# Patient Record
Sex: MALE | Race: OTHER | Employment: FULL TIME | URBAN - METROPOLITAN AREA
[De-identification: names, ages, dates, MRNs, and addresses within clinical notes are randomized per-mention and may not be internally consistent; named-entity substitution may affect disease eponyms.]

---

## 2017-06-07 ENCOUNTER — LAB ENCOUNTER (OUTPATIENT)
Dept: LAB | Facility: HOSPITAL | Age: 37
End: 2017-06-07
Attending: ORTHOPAEDIC SURGERY
Payer: COMMERCIAL

## 2017-06-07 DIAGNOSIS — M16.11 PRIMARY OSTEOARTHRITIS OF RIGHT HIP: ICD-10-CM

## 2017-06-07 PROCEDURE — 36415 COLL VENOUS BLD VENIPUNCTURE: CPT

## 2017-06-07 PROCEDURE — 86850 RBC ANTIBODY SCREEN: CPT

## 2017-06-07 PROCEDURE — 86900 BLOOD TYPING SEROLOGIC ABO: CPT

## 2017-06-07 PROCEDURE — 81003 URINALYSIS AUTO W/O SCOPE: CPT

## 2017-06-07 PROCEDURE — 86901 BLOOD TYPING SEROLOGIC RH(D): CPT

## 2017-06-15 RX ORDER — DEXTROAMPHETAMINE SACCHARATE, AMPHETAMINE ASPARTATE, DEXTROAMPHETAMINE SULFATE AND AMPHETAMINE SULFATE 2.5; 2.5; 2.5; 2.5 MG/1; MG/1; MG/1; MG/1
10 TABLET ORAL 2 TIMES DAILY
COMMUNITY

## 2017-06-15 RX ORDER — DEXLANSOPRAZOLE 60 MG/1
60 CAPSULE, DELAYED RELEASE ORAL 2 TIMES DAILY
COMMUNITY

## 2017-06-15 RX ORDER — AMLODIPINE BESYLATE AND BENAZEPRIL HYDROCHLORIDE 5; 40 MG/1; MG/1
1 CAPSULE ORAL DAILY
COMMUNITY

## 2017-06-21 ENCOUNTER — SURGERY (OUTPATIENT)
Age: 37
End: 2017-06-21

## 2017-06-21 ENCOUNTER — HOSPITAL ENCOUNTER (INPATIENT)
Facility: HOSPITAL | Age: 37
LOS: 2 days | Discharge: HOME | DRG: 470 | End: 2017-06-23
Attending: ORTHOPAEDIC SURGERY | Admitting: ORTHOPAEDIC SURGERY
Payer: COMMERCIAL

## 2017-06-21 ENCOUNTER — ANESTHESIA EVENT (OUTPATIENT)
Dept: SURGERY | Facility: HOSPITAL | Age: 37
DRG: 470 | End: 2017-06-21
Payer: COMMERCIAL

## 2017-06-21 ENCOUNTER — APPOINTMENT (OUTPATIENT)
Dept: GENERAL RADIOLOGY | Facility: HOSPITAL | Age: 37
DRG: 470 | End: 2017-06-21
Attending: NURSE PRACTITIONER
Payer: COMMERCIAL

## 2017-06-21 ENCOUNTER — ANESTHESIA (OUTPATIENT)
Dept: SURGERY | Facility: HOSPITAL | Age: 37
DRG: 470 | End: 2017-06-21
Payer: COMMERCIAL

## 2017-06-21 DIAGNOSIS — M16.11 PRIMARY OSTEOARTHRITIS OF RIGHT HIP: Primary | ICD-10-CM

## 2017-06-21 DIAGNOSIS — M87.051 AVASCULAR NECROSIS OF BONE OF RIGHT HIP (HCC): ICD-10-CM

## 2017-06-21 PROBLEM — G89.4 CHRONIC PAIN SYNDROME: Chronic | Status: ACTIVE | Noted: 2017-06-21

## 2017-06-21 PROBLEM — I10 HYPERTENSION: Chronic | Status: ACTIVE | Noted: 2017-06-21

## 2017-06-21 PROCEDURE — 72170 X-RAY EXAM OF PELVIS: CPT | Performed by: NURSE PRACTITIONER

## 2017-06-21 PROCEDURE — 99232 SBSQ HOSP IP/OBS MODERATE 35: CPT | Performed by: HOSPITALIST

## 2017-06-21 PROCEDURE — 0SR904A REPLACEMENT OF RIGHT HIP JOINT WITH CERAMIC ON POLYETHYLENE SYNTHETIC SUBSTITUTE, UNCEMENTED, OPEN APPROACH: ICD-10-PCS | Performed by: ORTHOPAEDIC SURGERY

## 2017-06-21 DEVICE — CONT VIVACI-E NEU LN KK 36X56: Type: IMPLANTABLE DEVICE | Site: HIP | Status: FUNCTIONAL

## 2017-06-21 DEVICE — CONT MULTI SHELL 56 KK: Type: IMPLANTABLE DEVICE | Site: HIP | Status: FUNCTIONAL

## 2017-06-21 DEVICE — BIOLOX® DELTA, CERAMIC FEMORAL HEAD, M, Ø 36/0, TAPER 12/14
Type: IMPLANTABLE DEVICE | Site: HIP | Status: FUNCTIONAL
Brand: BIOLOX® DELTA

## 2017-06-21 DEVICE — M/L TAP 10 EXT: Type: IMPLANTABLE DEVICE | Site: HIP | Status: FUNCTIONAL

## 2017-06-21 DEVICE — BONE SCREW 6.5X30 SELF-TAP: Type: IMPLANTABLE DEVICE | Site: HIP | Status: FUNCTIONAL

## 2017-06-21 RX ORDER — DOCUSATE SODIUM 100 MG/1
100 CAPSULE, LIQUID FILLED ORAL 2 TIMES DAILY
Status: DISCONTINUED | OUTPATIENT
Start: 2017-06-21 | End: 2017-06-23

## 2017-06-21 RX ORDER — METOCLOPRAMIDE 10 MG/1
10 TABLET ORAL ONCE
Status: DISCONTINUED | OUTPATIENT
Start: 2017-06-21 | End: 2017-06-21 | Stop reason: HOSPADM

## 2017-06-21 RX ORDER — METOCLOPRAMIDE HYDROCHLORIDE 5 MG/ML
10 INJECTION INTRAMUSCULAR; INTRAVENOUS EVERY 6 HOURS PRN
Status: ACTIVE | OUTPATIENT
Start: 2017-06-21 | End: 2017-06-23

## 2017-06-21 RX ORDER — ONDANSETRON 2 MG/ML
4 INJECTION INTRAMUSCULAR; INTRAVENOUS EVERY 4 HOURS PRN
Status: DISCONTINUED | OUTPATIENT
Start: 2017-06-21 | End: 2017-06-23

## 2017-06-21 RX ORDER — MORPHINE SULFATE 2 MG/ML
2 INJECTION, SOLUTION INTRAMUSCULAR; INTRAVENOUS EVERY 10 MIN PRN
Status: DISCONTINUED | OUTPATIENT
Start: 2017-06-21 | End: 2017-06-21 | Stop reason: HOSPADM

## 2017-06-21 RX ORDER — NALBUPHINE HCL 10 MG/ML
2.5 AMPUL (ML) INJECTION EVERY 4 HOURS PRN
Status: ACTIVE | OUTPATIENT
Start: 2017-06-21 | End: 2017-06-22

## 2017-06-21 RX ORDER — 0.9 % SODIUM CHLORIDE 0.9 %
VIAL (ML) INJECTION
Status: COMPLETED
Start: 2017-06-21 | End: 2017-06-21

## 2017-06-21 RX ORDER — FAMOTIDINE 20 MG/1
20 TABLET ORAL ONCE
Status: DISCONTINUED | OUTPATIENT
Start: 2017-06-21 | End: 2017-06-21 | Stop reason: HOSPADM

## 2017-06-21 RX ORDER — POLYETHYLENE GLYCOL 3350 17 G/17G
17 POWDER, FOR SOLUTION ORAL DAILY PRN
Status: DISCONTINUED | OUTPATIENT
Start: 2017-06-21 | End: 2017-06-23

## 2017-06-21 RX ORDER — ONDANSETRON 2 MG/ML
4 INJECTION INTRAMUSCULAR; INTRAVENOUS ONCE AS NEEDED
Status: DISCONTINUED | OUTPATIENT
Start: 2017-06-21 | End: 2017-06-21 | Stop reason: HOSPADM

## 2017-06-21 RX ORDER — ACETAMINOPHEN 325 MG/1
650 TABLET ORAL EVERY 6 HOURS PRN
Status: DISPENSED | OUTPATIENT
Start: 2017-06-21 | End: 2017-06-22

## 2017-06-21 RX ORDER — OXYCODONE HCL 10 MG/1
10 TABLET, FILM COATED, EXTENDED RELEASE ORAL ONCE
Status: COMPLETED | OUTPATIENT
Start: 2017-06-21 | End: 2017-06-21

## 2017-06-21 RX ORDER — HYDROMORPHONE HYDROCHLORIDE 1 MG/ML
0.2 INJECTION, SOLUTION INTRAMUSCULAR; INTRAVENOUS; SUBCUTANEOUS EVERY 5 MIN PRN
Status: DISCONTINUED | OUTPATIENT
Start: 2017-06-21 | End: 2017-06-21 | Stop reason: HOSPADM

## 2017-06-21 RX ORDER — MORPHINE SULFATE 10 MG/ML
6 INJECTION, SOLUTION INTRAMUSCULAR; INTRAVENOUS EVERY 10 MIN PRN
Status: DISCONTINUED | OUTPATIENT
Start: 2017-06-21 | End: 2017-06-21 | Stop reason: HOSPADM

## 2017-06-21 RX ORDER — FAMOTIDINE 20 MG/1
20 TABLET ORAL 2 TIMES DAILY
Status: DISCONTINUED | OUTPATIENT
Start: 2017-06-21 | End: 2017-06-21 | Stop reason: ALTCHOICE

## 2017-06-21 RX ORDER — DEXAMETHASONE SODIUM PHOSPHATE 4 MG/ML
VIAL (ML) INJECTION AS NEEDED
Status: DISCONTINUED | OUTPATIENT
Start: 2017-06-21 | End: 2017-06-21 | Stop reason: SURG

## 2017-06-21 RX ORDER — BISACODYL 10 MG
10 SUPPOSITORY, RECTAL RECTAL
Status: DISCONTINUED | OUTPATIENT
Start: 2017-06-21 | End: 2017-06-23

## 2017-06-21 RX ORDER — OXYCODONE AND ACETAMINOPHEN 10; 325 MG/1; MG/1
2 TABLET ORAL EVERY 6 HOURS PRN
Status: DISCONTINUED | OUTPATIENT
Start: 2017-06-21 | End: 2017-06-22

## 2017-06-21 RX ORDER — SODIUM CHLORIDE, SODIUM LACTATE, POTASSIUM CHLORIDE, CALCIUM CHLORIDE 600; 310; 30; 20 MG/100ML; MG/100ML; MG/100ML; MG/100ML
INJECTION, SOLUTION INTRAVENOUS CONTINUOUS
Status: DISCONTINUED | OUTPATIENT
Start: 2017-06-21 | End: 2017-06-21 | Stop reason: HOSPADM

## 2017-06-21 RX ORDER — ACETAMINOPHEN 325 MG/1
650 TABLET ORAL ONCE
Status: COMPLETED | OUTPATIENT
Start: 2017-06-21 | End: 2017-06-21

## 2017-06-21 RX ORDER — SODIUM CHLORIDE 0.9 % (FLUSH) 0.9 %
10 SYRINGE (ML) INJECTION AS NEEDED
Status: DISCONTINUED | OUTPATIENT
Start: 2017-06-21 | End: 2017-06-23

## 2017-06-21 RX ORDER — HETASTARCH 6 G/100ML
500 INJECTION, SOLUTION INTRAVENOUS ONCE
Status: COMPLETED | OUTPATIENT
Start: 2017-06-21 | End: 2017-06-21

## 2017-06-21 RX ORDER — HYDROCODONE BITARTRATE AND ACETAMINOPHEN 7.5; 325 MG/1; MG/1
2 TABLET ORAL EVERY 6 HOURS PRN
Status: DISCONTINUED | OUTPATIENT
Start: 2017-06-21 | End: 2017-06-22

## 2017-06-21 RX ORDER — HALOPERIDOL 5 MG/ML
0.5 INJECTION INTRAMUSCULAR ONCE AS NEEDED
Status: ACTIVE | OUTPATIENT
Start: 2017-06-21 | End: 2017-06-21

## 2017-06-21 RX ORDER — DIPHENHYDRAMINE HCL 25 MG
25 CAPSULE ORAL EVERY 4 HOURS PRN
Status: ACTIVE | OUTPATIENT
Start: 2017-06-21 | End: 2017-06-22

## 2017-06-21 RX ORDER — CELECOXIB 200 MG/1
200 CAPSULE ORAL ONCE
Status: COMPLETED | OUTPATIENT
Start: 2017-06-21 | End: 2017-06-21

## 2017-06-21 RX ORDER — HYDROMORPHONE HYDROCHLORIDE 1 MG/ML
0.6 INJECTION, SOLUTION INTRAMUSCULAR; INTRAVENOUS; SUBCUTANEOUS
Status: DISCONTINUED | OUTPATIENT
Start: 2017-06-21 | End: 2017-06-22

## 2017-06-21 RX ORDER — DIPHENHYDRAMINE HYDROCHLORIDE 50 MG/ML
12.5 INJECTION INTRAMUSCULAR; INTRAVENOUS EVERY 4 HOURS PRN
Status: ACTIVE | OUTPATIENT
Start: 2017-06-21 | End: 2017-06-22

## 2017-06-21 RX ORDER — SCOLOPAMINE TRANSDERMAL SYSTEM 1 MG/1
1 PATCH, EXTENDED RELEASE TRANSDERMAL ONCE
Status: DISCONTINUED | OUTPATIENT
Start: 2017-06-21 | End: 2017-06-23

## 2017-06-21 RX ORDER — HYDROCODONE BITARTRATE AND ACETAMINOPHEN 5; 325 MG/1; MG/1
2 TABLET ORAL AS NEEDED
Status: DISCONTINUED | OUTPATIENT
Start: 2017-06-21 | End: 2017-06-21 | Stop reason: HOSPADM

## 2017-06-21 RX ORDER — SODIUM CHLORIDE, SODIUM LACTATE, POTASSIUM CHLORIDE, CALCIUM CHLORIDE 600; 310; 30; 20 MG/100ML; MG/100ML; MG/100ML; MG/100ML
INJECTION, SOLUTION INTRAVENOUS CONTINUOUS PRN
Status: DISCONTINUED | OUTPATIENT
Start: 2017-06-21 | End: 2017-06-21 | Stop reason: SURG

## 2017-06-21 RX ORDER — BUPIVACAINE HYDROCHLORIDE 7.5 MG/ML
INJECTION, SOLUTION INTRASPINAL AS NEEDED
Status: DISCONTINUED | OUTPATIENT
Start: 2017-06-21 | End: 2017-06-21 | Stop reason: SURG

## 2017-06-21 RX ORDER — OXYCODONE HCL 20 MG/1
20 TABLET, FILM COATED, EXTENDED RELEASE ORAL EVERY 8 HOURS
Status: DISCONTINUED | OUTPATIENT
Start: 2017-06-21 | End: 2017-06-22

## 2017-06-21 RX ORDER — ONDANSETRON 2 MG/ML
4 INJECTION INTRAMUSCULAR; INTRAVENOUS ONCE AS NEEDED
Status: ACTIVE | OUTPATIENT
Start: 2017-06-21 | End: 2017-06-21

## 2017-06-21 RX ORDER — HYDROCODONE BITARTRATE AND ACETAMINOPHEN 7.5; 325 MG/1; MG/1
1 TABLET ORAL EVERY 6 HOURS PRN
Status: DISCONTINUED | OUTPATIENT
Start: 2017-06-21 | End: 2017-06-22

## 2017-06-21 RX ORDER — MORPHINE SULFATE 4 MG/ML
4 INJECTION, SOLUTION INTRAMUSCULAR; INTRAVENOUS EVERY 10 MIN PRN
Status: DISCONTINUED | OUTPATIENT
Start: 2017-06-21 | End: 2017-06-21 | Stop reason: HOSPADM

## 2017-06-21 RX ORDER — MORPHINE SULFATE 1 MG/ML
INJECTION, SOLUTION EPIDURAL; INTRATHECAL; INTRAVENOUS AS NEEDED
Status: DISCONTINUED | OUTPATIENT
Start: 2017-06-21 | End: 2017-06-21 | Stop reason: SURG

## 2017-06-21 RX ORDER — SODIUM PHOSPHATE, DIBASIC AND SODIUM PHOSPHATE, MONOBASIC 7; 19 G/133ML; G/133ML
1 ENEMA RECTAL ONCE AS NEEDED
Status: DISCONTINUED | OUTPATIENT
Start: 2017-06-21 | End: 2017-06-23

## 2017-06-21 RX ORDER — FAMOTIDINE 10 MG/ML
20 INJECTION, SOLUTION INTRAVENOUS 2 TIMES DAILY
Status: DISCONTINUED | OUTPATIENT
Start: 2017-06-21 | End: 2017-06-21 | Stop reason: ALTCHOICE

## 2017-06-21 RX ORDER — HYDROMORPHONE HYDROCHLORIDE 4 MG/1
4 TABLET ORAL
Status: DISCONTINUED | OUTPATIENT
Start: 2017-06-21 | End: 2017-06-22

## 2017-06-21 RX ORDER — AMLODIPINE BESYLATE AND BENAZEPRIL HYDROCHLORIDE 5; 40 MG/1; MG/1
1 CAPSULE ORAL DAILY
Status: DISCONTINUED | OUTPATIENT
Start: 2017-06-22 | End: 2017-06-21 | Stop reason: RX

## 2017-06-21 RX ORDER — SODIUM CHLORIDE 9 MG/ML
INJECTION, SOLUTION INTRAVENOUS CONTINUOUS
Status: DISCONTINUED | OUTPATIENT
Start: 2017-06-21 | End: 2017-06-23

## 2017-06-21 RX ORDER — MIDAZOLAM HYDROCHLORIDE 1 MG/ML
INJECTION INTRAMUSCULAR; INTRAVENOUS AS NEEDED
Status: DISCONTINUED | OUTPATIENT
Start: 2017-06-21 | End: 2017-06-21 | Stop reason: SURG

## 2017-06-21 RX ORDER — HALOPERIDOL 5 MG/ML
0.25 INJECTION INTRAMUSCULAR ONCE AS NEEDED
Status: DISCONTINUED | OUTPATIENT
Start: 2017-06-21 | End: 2017-06-21 | Stop reason: HOSPADM

## 2017-06-21 RX ORDER — MAGNESIUM HYDROXIDE 1200 MG/15ML
LIQUID ORAL CONTINUOUS PRN
Status: DISCONTINUED | OUTPATIENT
Start: 2017-06-21 | End: 2017-06-21

## 2017-06-21 RX ORDER — ONDANSETRON 2 MG/ML
INJECTION INTRAMUSCULAR; INTRAVENOUS AS NEEDED
Status: DISCONTINUED | OUTPATIENT
Start: 2017-06-21 | End: 2017-06-21 | Stop reason: SURG

## 2017-06-21 RX ORDER — NALOXONE HYDROCHLORIDE 0.4 MG/ML
0.08 INJECTION, SOLUTION INTRAMUSCULAR; INTRAVENOUS; SUBCUTANEOUS
Status: ACTIVE | OUTPATIENT
Start: 2017-06-21 | End: 2017-06-22

## 2017-06-21 RX ORDER — DEXTROAMPHETAMINE SACCHARATE, AMPHETAMINE ASPARTATE, DEXTROAMPHETAMINE SULFATE AND AMPHETAMINE SULFATE 2.5; 2.5; 2.5; 2.5 MG/1; MG/1; MG/1; MG/1
10 TABLET ORAL 2 TIMES DAILY
Status: DISCONTINUED | OUTPATIENT
Start: 2017-06-21 | End: 2017-06-23

## 2017-06-21 RX ORDER — BUPIVACAINE HYDROCHLORIDE 2.5 MG/ML
INJECTION, SOLUTION EPIDURAL; INFILTRATION; INTRACAUDAL AS NEEDED
Status: DISCONTINUED | OUTPATIENT
Start: 2017-06-21 | End: 2017-06-21 | Stop reason: HOSPADM

## 2017-06-21 RX ORDER — KETOROLAC TROMETHAMINE 30 MG/ML
30 INJECTION, SOLUTION INTRAMUSCULAR; INTRAVENOUS ONCE
Status: COMPLETED | OUTPATIENT
Start: 2017-06-21 | End: 2017-06-21

## 2017-06-21 RX ORDER — LIDOCAINE HYDROCHLORIDE 10 MG/ML
INJECTION, SOLUTION EPIDURAL; INFILTRATION; INTRACAUDAL; PERINEURAL AS NEEDED
Status: DISCONTINUED | OUTPATIENT
Start: 2017-06-21 | End: 2017-06-21 | Stop reason: SURG

## 2017-06-21 RX ORDER — PANTOPRAZOLE SODIUM 40 MG/1
40 TABLET, DELAYED RELEASE ORAL
Status: DISCONTINUED | OUTPATIENT
Start: 2017-06-22 | End: 2017-06-23

## 2017-06-21 RX ORDER — HYDROMORPHONE HYDROCHLORIDE 1 MG/ML
0.4 INJECTION, SOLUTION INTRAMUSCULAR; INTRAVENOUS; SUBCUTANEOUS EVERY 5 MIN PRN
Status: DISCONTINUED | OUTPATIENT
Start: 2017-06-21 | End: 2017-06-21 | Stop reason: HOSPADM

## 2017-06-21 RX ORDER — HYDROMORPHONE HYDROCHLORIDE 1 MG/ML
0.4 INJECTION, SOLUTION INTRAMUSCULAR; INTRAVENOUS; SUBCUTANEOUS
Status: DISCONTINUED | OUTPATIENT
Start: 2017-06-21 | End: 2017-06-22

## 2017-06-21 RX ORDER — DIPHENHYDRAMINE HYDROCHLORIDE 50 MG/ML
INJECTION INTRAMUSCULAR; INTRAVENOUS AS NEEDED
Status: DISCONTINUED | OUTPATIENT
Start: 2017-06-21 | End: 2017-06-21 | Stop reason: SURG

## 2017-06-21 RX ORDER — HYDROMORPHONE HYDROCHLORIDE 1 MG/ML
0.6 INJECTION, SOLUTION INTRAMUSCULAR; INTRAVENOUS; SUBCUTANEOUS EVERY 5 MIN PRN
Status: DISCONTINUED | OUTPATIENT
Start: 2017-06-21 | End: 2017-06-21 | Stop reason: HOSPADM

## 2017-06-21 RX ORDER — NALOXONE HYDROCHLORIDE 0.4 MG/ML
80 INJECTION, SOLUTION INTRAMUSCULAR; INTRAVENOUS; SUBCUTANEOUS AS NEEDED
Status: DISCONTINUED | OUTPATIENT
Start: 2017-06-21 | End: 2017-06-21 | Stop reason: HOSPADM

## 2017-06-21 RX ORDER — HYDROCODONE BITARTRATE AND ACETAMINOPHEN 5; 325 MG/1; MG/1
1 TABLET ORAL AS NEEDED
Status: DISCONTINUED | OUTPATIENT
Start: 2017-06-21 | End: 2017-06-21 | Stop reason: HOSPADM

## 2017-06-21 RX ADMIN — MIDAZOLAM HYDROCHLORIDE 4 MG: 1 INJECTION INTRAMUSCULAR; INTRAVENOUS at 09:10:00

## 2017-06-21 RX ADMIN — MORPHINE SULFATE 0.3 MG: 1 INJECTION, SOLUTION EPIDURAL; INTRATHECAL; INTRAVENOUS at 09:16:00

## 2017-06-21 RX ADMIN — SODIUM CHLORIDE, SODIUM LACTATE, POTASSIUM CHLORIDE, CALCIUM CHLORIDE: 600; 310; 30; 20 INJECTION, SOLUTION INTRAVENOUS at 10:10:00

## 2017-06-21 RX ADMIN — DIPHENHYDRAMINE HYDROCHLORIDE 25 MG: 50 INJECTION INTRAMUSCULAR; INTRAVENOUS at 09:25:00

## 2017-06-21 RX ADMIN — DEXAMETHASONE SODIUM PHOSPHATE 4 MG: 4 MG/ML VIAL (ML) INJECTION at 09:25:00

## 2017-06-21 RX ADMIN — SODIUM CHLORIDE, SODIUM LACTATE, POTASSIUM CHLORIDE, CALCIUM CHLORIDE: 600; 310; 30; 20 INJECTION, SOLUTION INTRAVENOUS at 09:10:00

## 2017-06-21 RX ADMIN — BUPIVACAINE HYDROCHLORIDE 2 ML: 7.5 INJECTION, SOLUTION INTRASPINAL at 09:16:00

## 2017-06-21 RX ADMIN — ONDANSETRON 4 MG: 2 INJECTION INTRAMUSCULAR; INTRAVENOUS at 09:25:00

## 2017-06-21 RX ADMIN — LIDOCAINE HYDROCHLORIDE 10 MG: 10 INJECTION, SOLUTION EPIDURAL; INFILTRATION; INTRACAUDAL; PERINEURAL at 09:16:00

## 2017-06-21 NOTE — PROGRESS NOTES
Lotrel (amlodipine/benazepril) has been substituted with amlodipine/lisinopril per P & T Committee Protocol.     Smith LuiD

## 2017-06-21 NOTE — ANESTHESIA PROCEDURE NOTES
Spinal Block  Performed by: Malcolm Verdugo  Authorized by: Malcolm Verdugo    Patient Location:  OR  Start Time:  6/21/2017 9:15 AM  End Time:  6/21/2017 9:20 AM  Site identification: surface landmarks    Anesthesiologist:  Malcolm Verdugo  Performed by:   Ronda

## 2017-06-21 NOTE — DISCHARGE PLANNING
SW received order for s/p total joint. SW met w/ pt to discuss discharge planning. Pt lives w/ his wife and 3 children, in a 2 level house w/ 14 stairs. Pt reports to be independent w/ all ADL's and does not own any DME.  Pt's wife will be his caregiver at

## 2017-06-21 NOTE — OPERATIVE REPORT
[facility]    TATI Brief Operative Note    Ovi Yousif. Patient Status:  Surgery Admit    1980 MRN [id number]   [place] [address] Attending Fior Crandall MD     PCP No primary care provider on file.

## 2017-06-21 NOTE — ANESTHESIA PREPROCEDURE EVALUATION
Anesthesia PreOp Note    HPI:     Opal Mckenna. is a 29year old male who presents for preoperative consultation requested by: Sushma Pacheco MD    Date of Surgery: 6/21/2017    Procedure(s):  HIP TOTAL REPLACEMENT  Indication: Degenerative joint d N, NP 1 patch at 06/21/17 0817   CeFAZolin Sodium (ANCEF/KEFZOL) IVPB premix 2 g 2 g Intravenous Once Maria Ines Day NP    bupivacaine liposome (EXPAREL) 1.3 % injection SUSP 20 mL 20 mL Infiltration On Call to OR Grazyna Knight MD    Tranexamic Acid for pain control at [facility] that did not work. Accepts spinal/duramorph. GA back up. Consent checked. Taking a lot of pain meds at home. Post op pain control may be an issue.   Informed Consent Plan and Risks Discussed With:  Patient      I have informed

## 2017-06-21 NOTE — H&P
History & Physical Examination    Patient Name: Juan Galarza.   MRN: [id number]  [facility]: [id number]  YOB: 1980    Diagnosis: right hip avascular necrosis    Present Illness: 28 y/o male with R hip AVN, has exhausted non surgical treatment mg 0.2 mg Intravenous Q5 Min PRN   HYDROmorphone HCl PF (DILAUDID) 1 MG/ML injection 0.4 mg 0.4 mg Intravenous Q5 Min PRN   HYDROmorphone HCl PF (DILAUDID) 1 MG/ML injection 0.6 mg 0.6 mg Intravenous Q5 Min PRN   morphINE sulfate (PF) 2 MG/ML injection 2 m On Call to OR       Allergies: Review of patient's allergies indicates no known allergies.     Past Medical History   Diagnosis Date   • High blood pressure    • Back problem    • Anxiety state    • Attention deficit hyperactivity disorder (ADHD)    • TIA (

## 2017-06-21 NOTE — PHYSICAL THERAPY NOTE
PHYSICAL THERAPY HIP EVALUATION - INPATIENT     Room Number: 436/436-A  Evaluation Date: 6/21/2017  Type of Evaluation: Initial  Physician Order: PT Eval and Treat    Presenting Problem: AVN s/p right total hip arthroplasty  Reason for Therapy: Mobility Dy Avascular necrosis of bone of right hip (HCC)  Active Problems:    Hypertension    Chronic pain syndrome    Aseptic necrosis of bone of right hip [facility])      Past Medical History  Past Medical History   Diagnosis Date   • High blood pressure    • Back probl MOBILITY  How much difficulty does the patient currently have. ..  -   Turning over in bed (including adjusting bedclothes, sheets and blankets)?: A Little   -   Sitting down on and standing up from a chair with arms (e.g., wheelchair, bedside commode, etc. supervision   Goal #4   Current Status    Goal #5 Patient verbalizes and/or demonstrates all precautions and safety concerns independently   Goal #5   Current Status    Goal #6 Patient independently performs home exercise program for ROM/strengthening per

## 2017-06-21 NOTE — ANESTHESIA POSTPROCEDURE EVALUATION
Patient: Jez Cabrera.     Procedure Summary     Date Anesthesia Start Anesthesia Stop Room / Location    06/21/17 0910 1057 [facility] MAIN OR 10 / [facility] MAIN OR       Procedure Diagnosis Surgeon Responsible Provider    HIP TOTAL REPLACEMENT (Right Hip) (Jena

## 2017-06-21 NOTE — PROGRESS NOTES
[facility] - [facility]    Progress Note    Cynthia Banda.  Patient Status:  Surgery Admit    1980 MRN [id number]   Location [address] [facility] Attending Dez Reyes MD   Hosp Day # 0 PCP No primary care prov Chronic pain syndrome  TAKES TAPENDATOL AT HOME, RESUME HOME DOSE, CONSULT PAIN SERVICE.          Results:   No results found for: WBC, HGB, HCT, PLT, CREATSERUM, BUN, NA, K, CL, CO2, GLU, CA, ALB, ALKPHO, BILT, TP, AST, ALT, PTT, INR, PT, T4F, TSH, TSHREFL

## 2017-06-22 PROCEDURE — 99232 SBSQ HOSP IP/OBS MODERATE 35: CPT | Performed by: HOSPITALIST

## 2017-06-22 RX ORDER — ACETAMINOPHEN 325 MG/1
650 TABLET ORAL EVERY 6 HOURS PRN
Status: DISCONTINUED | OUTPATIENT
Start: 2017-06-22 | End: 2017-06-23

## 2017-06-22 RX ORDER — HYDROMORPHONE HYDROCHLORIDE 4 MG/1
8 TABLET ORAL
Status: DISCONTINUED | OUTPATIENT
Start: 2017-06-22 | End: 2017-06-22

## 2017-06-22 RX ORDER — HYDROMORPHONE HYDROCHLORIDE 4 MG/1
12 TABLET ORAL
Status: DISCONTINUED | OUTPATIENT
Start: 2017-06-22 | End: 2017-06-23

## 2017-06-22 RX ORDER — HYDROMORPHONE HYDROCHLORIDE 4 MG/1
8 TABLET ORAL
Status: DISCONTINUED | OUTPATIENT
Start: 2017-06-22 | End: 2017-06-23

## 2017-06-22 RX ORDER — HYDROMORPHONE HYDROCHLORIDE 4 MG/1
4 TABLET ORAL ONCE
Status: COMPLETED | OUTPATIENT
Start: 2017-06-22 | End: 2017-06-22

## 2017-06-22 NOTE — PHYSICAL THERAPY NOTE
Pt is being seen today BID for therapy. Will attempt stairs in the therapy gym in the pm session. Pt is on track to dc to home with assist and HHC ,but pt stated he might want to start out patient therapy right away.

## 2017-06-22 NOTE — OPERATIVE REPORT
[address] [address]?[phone]\H OPERATIVE REPORT\[id number] NAME: Venkatesh Nino   MR#: [id number]   PMD:     DATE OF PROCEDURE: 21-Jun-2017   PREOPERATIVE DIAGNOSIS: Osteonecrosis of the right hip procedure and informed consent was obtained.  The risks discussed included, but were not limited, to infection, nerve injury (sciatic and femoral nerves), arterial injury (femoral and superior gluteal arteries), deep venous thrombosis, pulmonary embolus, we This gave us excellent visualization of the hip joint and protection of the sciatic nerve.  We then dislocated the hip with flexion, adduction and internal rotation and measured approximately a 10mm neck osteotomy proximal to the lesser trochanter as we tem degrees of anteversion. Once the stem was fully seated, we then cleaned and dried the trunion of the stem and placed our 36mm, +0mm offset trial femoral head in place and impacted it into position.  Once it was fully seated, we then reduced the hip and took

## 2017-06-22 NOTE — PROGRESS NOTES
[facility]    Progress Note    Rachid Aguirre. Patient Status:  Inpatient    1980 MRN [id number]   Location [facility] 4W/SW/SE Attending Red Leslie MD   Hosp Day # 1 PCP No primary care provider on file.        Sub CONCLUSION: Right hip arthroplasty.                Leigh Ann Jain MD  [facility]  [phone] (c)  [phone] (o)  6/22/2017

## 2017-06-22 NOTE — DISCHARGE SUMMARY
[facility]    Discharge Summary    Otf Garrett. Patient Status:  Inpatient    1980 MRN [id number]   Location [facility] 4W/SW/SE Attending Zoe Roblero MD   Hosp Day # 1 PCP No primary care provider on file.      Date of calm and appropriate  Skin:  No rash, no lesion       Discharge Medications      START taking these medications       Instructions Prescription details    HYDROmorphone HCl 8 MG Tabs   Last time this was given:  12 mg on 6/23/2017  1:50 PM   Commonly known Where to Get Your Medications      Please  your prescriptions at the location directed by your doctor or nurse     Bring a paper prescription for each of these medications    - HYDROmorphone HCl 8 MG Tabs            Follow up Visits:

## 2017-06-22 NOTE — PLAN OF CARE
Patient continued to rate his pain as a 9/10 after first dose of Dilaudid 4 mg. Called MD.  Ordered to give a one dose of 8 mg of Dilaudid. Given by this RN.   Continued to have pain after PT and rest.  Called MD.  Ordered to give a 1 X dose of 4 mg wait

## 2017-06-22 NOTE — PROGRESS NOTES
[facility]    Progress Note    Juan Galarza. Patient Status:  Inpatient    1980 MRN [id number]   Location [facility] 4W/SW/SE Attending Crys Vazquez MD   Hosp Day # 1 PCP No primary care provider on file.      Subjective: Metoclopramide HCl, Prochlorperazine Edisylate, Tapentadol HCl ER, oxyCODONE-acetaminophen, HYDROmorphone HCl     Assessment and Plan:     Avascular necrosis of bone of right hip  - s/p hip arthroplasty  - pain control per anesthesia - on nucynta and dilau

## 2017-06-22 NOTE — OCCUPATIONAL THERAPY NOTE
OCCUPATIONAL THERAPY EVALUATION - INPATIENT      Room Number: 436/436-A  Evaluation Date: 6/22/2017  Type of Evaluation: Initial       Physician Order: IP Consult to Occupational Therapy  Reason for Therapy: ADL/IADL Dysfunction and Discharge Planning    O Surgical History    APPENDECTOMY      OTHER Left     Comment hip replacement    TOTAL HIP REPLACEMENT         HOME SITUATION  Type of Home: House  Home Layout: Two level  Lives With: Spouse       Shower/Tub and Equipment: Tub-shower combo  Other Equipment: (AM-PAC Scale): 57.54  CMS Modifier (G-Code): CH    FUNCTIONAL TRANSFER ASSESSMENT  Supine to Sit : Not tested  Sit to Stand: Supervision    Toilet Transfer: SBA  Shower Transfer: NT  Chair Transfer: SBA  Car Transfer: NT    Bedroom Mobility: SBA with use

## 2017-06-22 NOTE — PHYSICAL THERAPY NOTE
PHYSICAL THERAPY HIP TREATMENT NOTE - INPATIENT    Room Number: 436/436-A            Presenting Problem: AVN s/p right total hip arthroplasty    Problem List  Principal Problem:    Avascular necrosis of bone of right hip (HCC)  Active Problems:    Hyperten blankets)?: A Little   -   Sitting down on and standing up from a chair with arms (e.g., wheelchair, bedside commode, etc.): A Little   -   Moving from lying on back to sitting on the side of the bed?: A Little   How much help from another person does the Status Pt 'am/250'x2 pm sessions with the RW SBA. Goal #4 Patient will negotiate 14 stairs/one curb w/ assistive device and supervision   Goal #4   Current Status Pt performed 4 stairs x2 with 1 HR CGA for safety.     Goal #5 Patient verbalizes an

## 2017-06-22 NOTE — PLAN OF CARE
DISCHARGE PLANNING    • Discharge to home or other facility with appropriate resources Progressing    DAYANNA PLANS TO D/C HOME WITH OUTPATIENT PT WHEN CLEARED      HEMATOLOGIC - ADULT    • Maintains hematologic stability Progressing    • Free from bleedin

## 2017-06-22 NOTE — PAYOR COMM NOTE
Frieda Henry #[id number]    Admission Info: Inpatient (Adm: 06/21/17)   Hospital Account: [de-identified]    Description: 29year old M   Primary Service: Surgical   Unit Info: [address] [place] [address]         History and Physical      H&P by Kerrie Severino NP a patch  Transdermal  Once    CeFAZolin Sodium (ANCEF/KEFZOL) IVPB premix 2 g  2 g  Intravenous  Once    lactated ringers infusion    Intravenous  Continuous    HYDROcodone-acetaminophen (NORCO) 5-325 MG per tab 1 tablet  1 tablet  Oral  PRN    Or          H hydrocodone-acetaminophen (NORCO) 7.5-325 MG per tab 2 tablet  2 tablet  Oral  Q6H PRN    HYDROmorphone HCl PF (DILAUDID) 1 MG/ML injection 0.4 mg  0.4 mg  Intravenous  Q1H PRN    HYDROmorphone HCl PF (DILAUDID) 1 MG/ML injection 0.6 mg  0.6 mg  Intraven care.  [ x ] I have reviewed the History and Physical done within the last 30 days.  Any changes noted above.     SANDI URRUTIA  6/21/2017  8:50 AM  [phone]              Operative Report by Dima Clark MD at 6/21/2017  7:33 PM      Author: Lisandro Vogt corticosteroid injection. The patient`s pain progressed to the point that his activities of daily living are limited and he has a very limited range of motion and limited ability to walk.  This has negatively affected his ability to perform the basic [place] subcutaneous tissue. Bovie cautery was used to maintain hemostasis as we dissected sharply down to the level of the deep fascia. The deep fascia was identified and incised in line with our incision and our Charnley retractor was placed deep to this layer. t the medullary canal and a lateralizing reamer proximally. We then successfully broached up to a size 10 and noted that we had excellent fit and fill proximally within the femur and good rotational and axial stability.  We then trialed our 36mm, +0mm offset 24 hours. Pierre Rubinstein was present for all critical portions of the surgery and a backup surgeon was available at all times in case of emergency. \DICTATED BY: Chelsea Ko MD  D: 6/21/2017 8:04:27 PM            Digitally Signed by: Chelsea Ko MD. Reason: I have r     Recent Labs    Lab  06/22/17   0446    BUN   14    CREATSERUM   1.17    GFRAA   >60    GFRNAA   >60    CA   7.5*    NA   134*    K   4.1    CL   104    CO2   27    GLU   118*        Xr Pelvis (1 View) (cpt=72170)    6/21/2017  CONCLUSION:        Thais chronic pain     History of Present Illness:  Lakesha Mancilla. is a a(n) 29year old male who is s/p right total hip arthroplasty. Patient reports right hip pain. He rates his current pain as a 7/10.  Patient reports a long history of chronic hip pain da MG per tab 1 tablet, 1 tablet, Oral, Q6H PRN  •  hydrocodone-acetaminophen (NORCO) 7.5-325 MG per tab 2 tablet, 2 tablet, Oral, Q6H PRN  •  HYDROmorphone HCl PF (DILAUDID) 1 MG/ML injection 0.4 mg, 0.4 mg, Intravenous, Q1H PRN  •  HYDROmorphone HCl PF (DIL PRN    Review of Systems:  Pertinent items are noted in HPI. Physical Exam:  Blood pressure 101/51, pulse 93, temperature 98.8 °F (37.1 °C), temperature source Oral, resp. rate 20, height 6' 2\" (1.88 m), weight 226 lb (102.513 kg), SpO2 100 %.   Alert

## 2017-06-23 VITALS
TEMPERATURE: 100 F | SYSTOLIC BLOOD PRESSURE: 121 MMHG | HEIGHT: 74 IN | RESPIRATION RATE: 18 BRPM | WEIGHT: 226 LBS | BODY MASS INDEX: 29 KG/M2 | DIASTOLIC BLOOD PRESSURE: 66 MMHG | HEART RATE: 105 BPM | OXYGEN SATURATION: 98 %

## 2017-06-23 PROCEDURE — 99239 HOSP IP/OBS DSCHRG MGMT >30: CPT | Performed by: HOSPITALIST

## 2017-06-23 RX ORDER — HYDROMORPHONE HYDROCHLORIDE 8 MG/1
TABLET ORAL
Qty: 90 TABLET | Refills: 0 | Status: SHIPPED | OUTPATIENT
Start: 2017-06-23 | End: 2017-07-08

## 2017-06-23 NOTE — PROGRESS NOTES
[facility]  Inpatient Pain Management Progress Note      Patient name: [place]. 29year old male  : 1980  MRN: [id number]    Diagnosis: s/p right TATI    Reason for Consult: postoperative [place]     Current hospital day: [facility]

## 2017-06-23 NOTE — PHYSICAL THERAPY NOTE
Pt is being seen today BID for therapy. Pt is on track to dc to home with assist and to out patient therapy once medically cleared.

## 2017-06-23 NOTE — PROGRESS NOTES
[facility] - [facility]    Progress Note    Ovi Yousif. Patient Status:  Inpatient    1980 MRN [id number]   Location [facility] 4W/SW/SE Attending Henry Mejias MD   Hosp Day # 2 PCP No primary care provider on file.        Subjectiv home with home health when clears PT  6. Numbness to LLE: will monitor at this point given no motor deficits.     Results:     Recent Labs   Lab  06/22/17   0446  06/23/17   0455   RBC  3.51*  4.11*   HGB  10.6*  12.3*   HCT  31.0*  36.6*   MCV  88.4  89.0

## 2017-09-20 NOTE — PROGRESS NOTES
[facility] HOSP - [facility]  Report of Consultation    Juan aGlarza. Patient Status:  Inpatient    1980 MRN [id number]   Location [facility] 4W/SW/SE Attending Aleksandr Doyle MD   Hosp Day # 1 PCP No primary care provider on file. facility-administered medications:   •  scopolamine (TRANSDERM-SCOP) 1.5 mg patch, 1 patch, Transdermal, Once  •  Naloxone HCl (NARCAN) 0.4 MG/ML injection 0.08 mg, 0.08 mg, Intravenous, Q5 Min PRN  •  acetaminophen (TYLENOL) tab 650 mg, 650 mg, Oral, Q6H (EE[facility] only), , Oral, Daily  •  Nucynta ER (Tapentadol) 50mg PT OWN MED, 50 mg, Oral, BID PRN  •  OxyCODONE HCl ER (OXYCONTIN) 12 hr tab 20 mg, 20 mg, Oral, Q8H  •  oxyCODONE-acetaminophen (PERCOCET)  MG per tab 2 tablet, 2 tablet, Oral, Q6H PRN  •  HY 97.9

## 2019-03-11 PROBLEM — F11.20 OPIOID DEPENDENCE (HCC): Status: ACTIVE | Noted: 2019-03-11

## 2019-03-11 PROBLEM — M25.559 HIP PAIN: Status: ACTIVE | Noted: 2019-03-11

## 2019-03-11 PROBLEM — M79.18 MYOFASCIAL PAIN: Status: ACTIVE | Noted: 2019-03-11

## 2019-03-11 PROBLEM — M46.1 INFLAMMATION OF SACROILIAC JOINT (HCC): Status: ACTIVE | Noted: 2019-03-11

## 2019-03-11 PROBLEM — G89.29 CHRONIC PAIN: Status: ACTIVE | Noted: 2019-03-11

## 2019-04-08 PROBLEM — Z90.49 S/P LAPAROSCOPIC CHOLECYSTECTOMY: Status: ACTIVE | Noted: 2019-04-08

## 2023-10-26 ENCOUNTER — HOSPITAL ENCOUNTER (OUTPATIENT)
Dept: GENERAL RADIOLOGY | Age: 43
Discharge: HOME | End: 2023-10-26
Attending: FAMILY MEDICINE

## 2023-10-26 DIAGNOSIS — M25.551 BILATERAL HIP PAIN: ICD-10-CM

## 2023-10-26 DIAGNOSIS — M25.552 BILATERAL HIP PAIN: ICD-10-CM

## 2023-10-26 DIAGNOSIS — M54.2 CERVICALGIA: ICD-10-CM

## 2023-10-26 DIAGNOSIS — M54.50 LUMBAR PAIN: ICD-10-CM

## 2023-10-26 DIAGNOSIS — M54.17 LUMBOSACRAL RADICULITIS: ICD-10-CM

## 2023-10-26 DIAGNOSIS — M46.1 SACROILIITIS, NOT ELSEWHERE CLASSIFIED (CMD): ICD-10-CM

## 2023-10-26 DIAGNOSIS — M54.12 CERVICAL RADICULITIS: ICD-10-CM

## 2023-10-26 PROCEDURE — 73523 X-RAY EXAM HIPS BI 5/> VIEWS: CPT

## 2023-10-26 PROCEDURE — 72052 X-RAY EXAM NECK SPINE 6/>VWS: CPT

## 2023-10-26 PROCEDURE — 72110 X-RAY EXAM L-2 SPINE 4/>VWS: CPT

## 2023-11-02 ENCOUNTER — HOSPITAL ENCOUNTER (OUTPATIENT)
Dept: GENERAL RADIOLOGY | Age: 43
Discharge: HOME | End: 2023-11-02
Attending: ANESTHESIOLOGY

## 2023-11-02 DIAGNOSIS — M25.562 KNEE PAIN, LEFT: ICD-10-CM

## 2023-11-02 DIAGNOSIS — M25.561 KNEE PAIN, RIGHT: ICD-10-CM

## 2023-11-02 PROCEDURE — 73564 X-RAY EXAM KNEE 4 OR MORE: CPT

## 2023-12-07 ENCOUNTER — APPOINTMENT (OUTPATIENT)
Dept: URGENT CARE | Age: 43
End: 2023-12-07
Attending: FAMILY MEDICINE

## (undated) DEVICE — 2T8 #2 PDO 24 X 24: Brand: 2T8 #2 PDO 24 X 24

## (undated) DEVICE — PEN: MARKING STD PT 100/CS: Brand: MEDICAL ACTION INDUSTRIES

## (undated) DEVICE — T5 HOOD WITH PEEL AWAY FACE SHIELD

## (undated) DEVICE — 20 ML SYRINGE LUER-LOCK TIP: Brand: MONOJECT

## (undated) DEVICE — SOL  .9 3000ML

## (undated) DEVICE — POUCH: SSEAL TYVEK 2000/CS: Brand: MEDICAL ACTION INDUSTRIES

## (undated) DEVICE — PILLOW ABDUCTION HIP MED

## (undated) DEVICE — CAUTERY TIP BLADE EXTENSION

## (undated) DEVICE — STERILE LATEX POWDER-FREE SURGICAL GLOVES WITH HYDROGEL COATING, SMOOTH FINISH, STRAIGHT FINGER: Brand: PROTEXIS

## (undated) DEVICE — SOLUTION SURG DURA PREP HAZMAT

## (undated) DEVICE — SUTURE ETHIBOND 2 V-37

## (undated) DEVICE — PACK CDS TOTAL HIP

## (undated) DEVICE — DUAL CUT SAGITTAL BLADE

## (undated) DEVICE — BATTERY

## (undated) DEVICE — 2T9 -0- UNDYED MONODERM 36X36: Brand: 2T9 -0- UNDYED MONODERM 36X36

## (undated) DEVICE — STERILE LATEX POWDER-FREE SURGICAL GLOVESWITH NITRILE COATING: Brand: PROTEXIS

## (undated) DEVICE — Device: Brand: JELCO

## (undated) DEVICE — BIT DRL 30MM 3.2MM RNGLC ACTB

## (undated) DEVICE — SUTURE PDS II 1 CT-1

## (undated) DEVICE — CONTAINER SPEC STR 4OZ GRY LID

## (undated) DEVICE — NON-ADHERENT PADS PREPACK: Brand: TELFA

## (undated) DEVICE — GAUZE SPONGES,12 PLY: Brand: CURITY

## (undated) DEVICE — STERILE POLYISOPRENE POWDER-FREE SURGICAL GLOVES: Brand: PROTEXIS

## (undated) DEVICE — ADHV SKIN LIQUIBAND

## (undated) DEVICE — 3M™ MEDITPORE™ SOFT CLOTH TAPE 6 IN X 10 YD 12 ROLLS/CASE 2966: Brand: 3M™ MEDIPORE™

## (undated) DEVICE — SOL  .9 1000ML BTL

## (undated) DEVICE — SUTURE ETHIBOND 5-0 MB46G

## (undated) DEVICE — TRAY SRGPRP PVP IOD WT SCRB SM

## (undated) DEVICE — FAN SPRAY KIT: Brand: PULSAVAC®

## (undated) NOTE — IP AVS SNAPSHOT
[address] [facility] [phone]                Discharge Summary   6/21/2017    Neil Villanueva.            Admission Information        Provider Department    6/21/2017 Vanessa Ram MD [facility] 4w/Sw/ Morning Afternoon Evening As Needed    NUCYNTA  MG Tb12   Last time this was given:  250 mg on 6/23/2017  6:00 AM   Generic drug:  Tapentadol HCl ER   What changed:  Another medication with the same name was removed.  Continue taking this medication, instructed to contact the office if increasing drainage, redness, or swelling to the operative wound/extremity occurs. Similarly, if they develop fevers and chills they should call the office ASAP.     The patient will continue to wear QUANG hose to both leg 3.51 (L) (06/22/17)  10.6 (L) (06/22/17)  31.0 (L) (06/22/17)  88.4    (06/22/17)  178 (06/22/17)  8.0      Metabolic Lab Results  (Last result in the past 90 days)    HgbA1C Glucose BUN Creatinine Calcium Alkaline Phosph AST    -- (06/22/17)  118 (H) (06/ Expires: 8/22/2017 12:54 PM    If you have questions, you can call [phone] to talk to our [facility] Staff. Remember, MyChart is NOT to be used for urgent needs.   For medical emergencies, dial 911.             __________________________________ Tapentadol HCl ER (NUCYNTA ER) 250 MG Oral Tablet 12 Hr       Use:  Treat pain   Most common side effects: Constipation, drowsiness, dizziness, urinary retention (inability to urinate)   What to report to your healthcare team: Difficulty urinating, dizzin